# Patient Record
Sex: FEMALE | URBAN - METROPOLITAN AREA
[De-identification: names, ages, dates, MRNs, and addresses within clinical notes are randomized per-mention and may not be internally consistent; named-entity substitution may affect disease eponyms.]

---

## 2020-09-18 ENCOUNTER — APPOINTMENT (RX ONLY)
Dept: URBAN - METROPOLITAN AREA CLINIC 345 | Facility: CLINIC | Age: 40
Setting detail: DERMATOLOGY
End: 2020-09-18

## 2020-09-18 VITALS — TEMPERATURE: 97.5 F

## 2020-09-18 DIAGNOSIS — D22 MELANOCYTIC NEVI: ICD-10-CM

## 2020-09-18 DIAGNOSIS — L85.3 XEROSIS CUTIS: ICD-10-CM

## 2020-09-18 DIAGNOSIS — L20.89 OTHER ATOPIC DERMATITIS: ICD-10-CM

## 2020-09-18 DIAGNOSIS — L72.0 EPIDERMAL CYST: ICD-10-CM

## 2020-09-18 DIAGNOSIS — Q82.5 CONGENITAL NON-NEOPLASTIC NEVUS: ICD-10-CM

## 2020-09-18 PROBLEM — L20.84 INTRINSIC (ALLERGIC) ECZEMA: Status: ACTIVE | Noted: 2020-09-18

## 2020-09-18 PROBLEM — D22.5 MELANOCYTIC NEVI OF TRUNK: Status: ACTIVE | Noted: 2020-09-18

## 2020-09-18 PROCEDURE — ? PRESCRIPTION

## 2020-09-18 PROCEDURE — ? PATIENT SPECIFIC COUNSELING

## 2020-09-18 PROCEDURE — 99203 OFFICE O/P NEW LOW 30 MIN: CPT

## 2020-09-18 PROCEDURE — ? COUNSELING

## 2020-09-18 RX ORDER — TACROLIMUS 1 MG/G
OINTMENT TOPICAL
Qty: 1 | Refills: 5 | Status: ERX | COMMUNITY
Start: 2020-09-18

## 2020-09-18 RX ADMIN — TACROLIMUS: 1 OINTMENT TOPICAL at 00:00

## 2020-09-18 ASSESSMENT — LOCATION ZONE DERM
LOCATION ZONE: TRUNK
LOCATION ZONE: ARM
LOCATION ZONE: FACE

## 2020-09-18 ASSESSMENT — LOCATION DETAILED DESCRIPTION DERM
LOCATION DETAILED: RIGHT VENTRAL DISTAL FOREARM
LOCATION DETAILED: RIGHT RIB CAGE
LOCATION DETAILED: LEFT MEDIAL SUPERIOR CHEST
LOCATION DETAILED: LEFT MEDIAL BREAST 10-11:00 REGION
LOCATION DETAILED: LEFT VENTRAL DISTAL FOREARM
LOCATION DETAILED: RIGHT INFERIOR LATERAL MALAR CHEEK

## 2020-09-18 ASSESSMENT — LOCATION SIMPLE DESCRIPTION DERM
LOCATION SIMPLE: RIGHT FOREARM
LOCATION SIMPLE: LEFT BREAST
LOCATION SIMPLE: LEFT FOREARM
LOCATION SIMPLE: RIGHT CHEEK
LOCATION SIMPLE: CHEST
LOCATION SIMPLE: ABDOMEN

## 2020-09-18 NOTE — HPI: RASH (ECZEMA)
How Severe Is Your Eczema?: moderate
Is This A New Presentation, Or A Follow-Up?: Rash
Additional History: Pt has been treating her eczema with Lidex since she was 2 years old. She d/c because she developed liver problems which she believes to be related.

## 2020-09-18 NOTE — PROCEDURE: PATIENT SPECIFIC COUNSELING
Pt states she has used Lidex since she was 2 years which she believes is related to liver issues she is having. She is currently using shea butter to treat.
Detail Level: Zone